# Patient Record
Sex: FEMALE | URBAN - METROPOLITAN AREA
[De-identification: names, ages, dates, MRNs, and addresses within clinical notes are randomized per-mention and may not be internally consistent; named-entity substitution may affect disease eponyms.]

---

## 2024-07-15 ENCOUNTER — APPOINTMENT (RX ONLY)
Dept: URBAN - METROPOLITAN AREA CLINIC 94 | Facility: CLINIC | Age: 39
Setting detail: DERMATOLOGY
End: 2024-07-15

## 2024-07-15 DIAGNOSIS — L80 VITILIGO: ICD-10-CM

## 2024-07-15 PROBLEM — L81.9 DISORDER OF PIGMENTATION, UNSPECIFIED: Status: ACTIVE | Noted: 2024-07-15

## 2024-07-15 PROCEDURE — 99202 OFFICE O/P NEW SF 15 MIN: CPT

## 2024-07-15 PROCEDURE — ? ADDITIONAL NOTES

## 2024-07-15 PROCEDURE — ? COUNSELING

## 2024-07-15 ASSESSMENT — LOCATION SIMPLE DESCRIPTION DERM: LOCATION SIMPLE: RIGHT CHEEK

## 2024-07-15 ASSESSMENT — LOCATION DETAILED DESCRIPTION DERM: LOCATION DETAILED: RIGHT MEDIAL MANDIBULAR CHEEK

## 2024-07-15 ASSESSMENT — LOCATION ZONE DERM: LOCATION ZONE: FACE

## 2024-07-15 NOTE — PROCEDURE: ADDITIONAL NOTES
Detail Level: Detailed
Render Risk Assessment In Note?: no
Additional Notes: Recommended second opinion with Dr. Anthony
Additional Notes: Patient currently breast feeding and planning to try for another child in the next year.